# Patient Record
(demographics unavailable — no encounter records)

---

## 2025-07-29 NOTE — PLAN
[FreeTextEntry1] : Findings reviewed with patient.  Options for management discussed including no management at this time.  Patient would like a short-term trial of OCPs to see if this regulates her cycle.  She has no known history of any clotting disorders.  She is not a smoker.  Correct pill use, side effects, and danger signs reviewed.  Rx for 3 months provided.  Follow-up with Dr. Roa in 3 months.

## 2025-07-29 NOTE — HISTORY OF PRESENT ILLNESS
[FreeTextEntry1] : 37-year-old -0-0-2 with Mirena IUD in situ since  presents with several cycles of prolonged menstrual period.  This month in particular patient has had some type of bleeding for most of the days of the month.  She does report that the bleeding is light in amount and dark in color.  Does not fill a pad.  Does not pass any clots.  Does not report any dysmenorrhea.  Mirena was inserted initially for management of heavy menses with dysmenorrhea.

## 2025-07-29 NOTE — PROCEDURE
[Locate IUD] : locate IUD [Transvaginal Ultrasound] : transvaginal ultrasound [Anteverted] : anteverted [L: ___ cm] : L: [unfilled] cm [W: ___cm] : W: [unfilled] cm [H: ___ cm] : H: [unfilled] cm [FreeTextEntry9] : Prolonged menses [FreeTextEntry5] : Myomas: Right lateral 0.95 x 0.7 x 0.65 cm.  Endometrium: 6.35 mm [FreeTextEntry7] : 2.8 x 2.21 x 1.55 cm [FreeTextEntry8] : 2.93 x 2.53 x 1.29 cm [FreeTextEntry4] : Uterus with evidence of myoma.  The endometrium demonstrates an IUD in the proper location.  No free fluid is noted.  The right ovary has a normal appearance.  The left ovary has a normal appearance.  Cervix is unremarkable.